# Patient Record
Sex: MALE | Race: WHITE
[De-identification: names, ages, dates, MRNs, and addresses within clinical notes are randomized per-mention and may not be internally consistent; named-entity substitution may affect disease eponyms.]

---

## 2020-07-01 ENCOUNTER — HOSPITAL ENCOUNTER (EMERGENCY)
Dept: HOSPITAL 47 - EC | Age: 30
Discharge: HOME | End: 2020-07-01
Payer: COMMERCIAL

## 2020-07-01 VITALS — SYSTOLIC BLOOD PRESSURE: 132 MMHG | DIASTOLIC BLOOD PRESSURE: 87 MMHG | TEMPERATURE: 98.8 F | HEART RATE: 92 BPM

## 2020-07-01 VITALS — RESPIRATION RATE: 18 BRPM
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
## 2020-07-01 DIAGNOSIS — S60.512A: Primary | ICD-10-CM
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
## 2020-07-01 DIAGNOSIS — Z23: ICD-10-CM

## 2020-07-01 DIAGNOSIS — Z77.21: ICD-10-CM

## 2020-07-01 PROCEDURE — 99283 EMERGENCY DEPT VISIT LOW MDM: CPT

## 2020-07-01 PROCEDURE — 90471 IMMUNIZATION ADMIN: CPT

## 2020-07-01 PROCEDURE — 86803 HEPATITIS C AB TEST: CPT

## 2020-07-01 PROCEDURE — 36415 COLL VENOUS BLD VENIPUNCTURE: CPT

## 2020-07-01 PROCEDURE — 90715 TDAP VACCINE 7 YRS/> IM: CPT

## 2020-07-01 PROCEDURE — 87390 HIV-1 AG IA: CPT

## 2020-07-01 PROCEDURE — 87340 HEPATITIS B SURFACE AG IA: CPT

## 2020-07-01 PROCEDURE — 86706 HEP B SURFACE ANTIBODY: CPT

## 2020-07-01 NOTE — ED
Skin/Abscess/FB HPI





- General


Chief complaint: Skin/Abscess/Foreign Body


Stated complaint: [facility]-exposure


Time Seen by Provider: 07/01/20 16:55


Source: patient, RN notes reviewed


Mode of arrival: ambulatory


Limitations: no limitations





- History of Present Illness


Initial comments: 





This is a 20-year-old male with a benign past medical history who comes in for 

evaluation after exposure to blood and abrasion on his left hand.  He did 

apprehend a suspect and brought him into the emergency department due to the 

exposure to blood from the patient with a bloody nose he is being tested for 

HIV.  He denies any fevers chills nausea vomiting sweats or other symptoms no 

other medical concerns at this time.





- Related Data


                                    Allergies











Allergy/AdvReac Type Severity Reaction Status Date / Time


 


No Known Allergies Allergy   Verified 07/01/20 16:40














Review of Systems


ROS Statement: 


Those systems with pertinent positive or pertinent negative responses have been 

documented in the HPI.





ROS Other: All systems not noted in ROS Statement are negative.





Past Medical History


Past Medical History: No Reported History


History of Any Multi-Drug Resistant Organisms: None Reported


Past Surgical History: No Surgical Hx Reported


Past Psychological History: No Psychological Hx Reported


Smoking Status: Never smoker


Past Alcohol Use History: None Reported


Past Drug Use History: None Reported





General Exam





- General Exam Comments


Initial Comments: 





This is a well-developed well-nourished awake alert oriented 3 male


Limitations: no limitations


General appearance: alert, in no apparent distress


Head exam: Present: atraumatic, normocephalic, normal inspection


Eye exam: Present: normal appearance, PERRL, EOMI.  Absent: scleral icterus, 

conjunctival injection, periorbital swelling


Neck exam: Present: normal inspection


Extremities exam: Present: full ROM, normal capillary refill, other (Facial 

abrasions seen to the palmar aspect of the left hand as well as the lateral 

aspect of the proximal left index finger.).  Absent: tenderness


Neurological exam: Present: alert, oriented X3, CN II-XII intact


Psychiatric exam: Present: normal affect, normal mood


Skin exam: Present: warm, dry, normal color.  Absent: intact





Course


                                   Vital Signs











  07/01/20





  16:41


 


Temperature 99.9 F H


 


Pulse Rate 110 H


 


Respiratory 18





Rate 


 


Blood Pressure 136/92


 


O2 Sat by Pulse 95





Oximetry 














- Reevaluation(s)


Reevaluation #1: 





07/01/20 17:53


Patient did demonstrate an elevated temperature upon arrival he does state he 

had this happen in the suspected she will bring history as 10 is also very warm 

outside today.  He's had no symptoms of any type of illness.





Disposition


Clinical Impression: 


 Exposure to blood, Abrasion of left hand





Disposition: HOME SELF-CARE


Condition: Good


Instructions (If sedation given, give patient instructions):  Abrasion (ED)


Is patient prescribed a controlled substance at d/c from ED?: No


Referrals: 


None,Stated [Primary Care Provider] - 1-2 days

## 2020-07-02 LAB — HBV SURFACE AB SERPL IA-ACNC: 214.5 MIU/ML
